# Patient Record
Sex: MALE | Race: AMERICAN INDIAN OR ALASKA NATIVE
[De-identification: names, ages, dates, MRNs, and addresses within clinical notes are randomized per-mention and may not be internally consistent; named-entity substitution may affect disease eponyms.]

---

## 2018-03-01 ENCOUNTER — HOSPITAL ENCOUNTER (INPATIENT)
Dept: HOSPITAL 5 - ED | Age: 78
LOS: 1 days | Discharge: HOME | DRG: 880 | End: 2018-03-02
Attending: INTERNAL MEDICINE | Admitting: INTERNAL MEDICINE
Payer: MEDICARE

## 2018-03-01 DIAGNOSIS — F41.9: Primary | ICD-10-CM

## 2018-03-01 DIAGNOSIS — Z82.49: ICD-10-CM

## 2018-03-01 DIAGNOSIS — I50.30: ICD-10-CM

## 2018-03-01 LAB
BASOPHILS # (AUTO): 0 K/MM3 (ref 0–0.1)
BASOPHILS # (AUTO): 0.1 K/MM3 (ref 0–0.1)
BASOPHILS NFR BLD AUTO: 0.4 % (ref 0–1.8)
BASOPHILS NFR BLD AUTO: 0.8 % (ref 0–1.8)
BUN SERPL-MCNC: 12 MG/DL (ref 9–20)
BUN SERPL-MCNC: 13 MG/DL (ref 9–20)
BUN/CREAT SERPL: 16 %
BUN/CREAT SERPL: 17 %
CALCIUM SERPL-MCNC: 8.3 MG/DL (ref 8.4–10.2)
CALCIUM SERPL-MCNC: 8.5 MG/DL (ref 8.4–10.2)
EOSINOPHIL # BLD AUTO: 0.1 K/MM3 (ref 0–0.4)
EOSINOPHIL # BLD AUTO: 0.1 K/MM3 (ref 0–0.4)
EOSINOPHIL NFR BLD AUTO: 0.9 % (ref 0–4.3)
EOSINOPHIL NFR BLD AUTO: 1.1 % (ref 0–4.3)
HCT VFR BLD CALC: 40 % (ref 35.5–45.6)
HCT VFR BLD CALC: 41.7 % (ref 35.5–45.6)
HDLC SERPL-MCNC: 45 MG/DL (ref 40–59)
HEMOLYSIS INDEX: 13
HEMOLYSIS INDEX: 69
HGB BLD-MCNC: 13.6 GM/DL (ref 11.8–15.2)
HGB BLD-MCNC: 13.9 GM/DL (ref 11.8–15.2)
LYMPHOCYTES # BLD AUTO: 1.8 K/MM3 (ref 1.2–5.4)
LYMPHOCYTES # BLD AUTO: 2.6 K/MM3 (ref 1.2–5.4)
LYMPHOCYTES NFR BLD AUTO: 23.9 % (ref 13.4–35)
LYMPHOCYTES NFR BLD AUTO: 30 % (ref 13.4–35)
MCH RBC QN AUTO: 31 PG (ref 28–32)
MCH RBC QN AUTO: 31 PG (ref 28–32)
MCHC RBC AUTO-ENTMCNC: 33 % (ref 32–34)
MCHC RBC AUTO-ENTMCNC: 34 % (ref 32–34)
MCV RBC AUTO: 91 FL (ref 84–94)
MCV RBC AUTO: 92 FL (ref 84–94)
MONOCYTES # (AUTO): 0.5 K/MM3 (ref 0–0.8)
MONOCYTES # (AUTO): 0.8 K/MM3 (ref 0–0.8)
MONOCYTES % (AUTO): 6.8 % (ref 0–7.3)
MONOCYTES % (AUTO): 9.3 % (ref 0–7.3)
PLATELET # BLD: 224 K/MM3 (ref 140–440)
PLATELET # BLD: 228 K/MM3 (ref 140–440)
RBC # BLD AUTO: 4.42 M/MM3 (ref 3.65–5.03)
RBC # BLD AUTO: 4.54 M/MM3 (ref 3.65–5.03)

## 2018-03-01 PROCEDURE — 85025 COMPLETE CBC W/AUTO DIFF WBC: CPT

## 2018-03-01 PROCEDURE — 78452 HT MUSCLE IMAGE SPECT MULT: CPT

## 2018-03-01 PROCEDURE — 80061 LIPID PANEL: CPT

## 2018-03-01 PROCEDURE — A9502 TC99M TETROFOSMIN: HCPCS

## 2018-03-01 PROCEDURE — 93010 ELECTROCARDIOGRAM REPORT: CPT

## 2018-03-01 PROCEDURE — 93005 ELECTROCARDIOGRAM TRACING: CPT

## 2018-03-01 PROCEDURE — 84484 ASSAY OF TROPONIN QUANT: CPT

## 2018-03-01 PROCEDURE — 93017 CV STRESS TEST TRACING ONLY: CPT

## 2018-03-01 PROCEDURE — 71046 X-RAY EXAM CHEST 2 VIEWS: CPT

## 2018-03-01 PROCEDURE — 93306 TTE W/DOPPLER COMPLETE: CPT

## 2018-03-01 PROCEDURE — 36415 COLL VENOUS BLD VENIPUNCTURE: CPT

## 2018-03-01 PROCEDURE — 80048 BASIC METABOLIC PNL TOTAL CA: CPT
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
## 2018-03-01 NOTE — XRAY REPORT
FINAL REPORT



EXAM:  XR CHEST ROUTINE 2V



HISTORY:  chest pain 



TECHNIQUE:  PA and lateral views of the chest



PRIORS:  None.



FINDINGS:  

Lines, tubes, and devices: N/A



Lungs and pleura: Trachea is normal in position.  Lungs are clear

of infiltrate, pleural effusion, vascular congestion, or

pneumothorax. 



Cardiomediastinal silhouette: Cardiac and mediastinal silhouettes

are unremarkable.



Other: Bony structures are intact.



IMPRESSION:  

No acute cardiopulmonary process seen.
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
## 2018-03-02 VITALS — SYSTOLIC BLOOD PRESSURE: 101 MMHG | DIASTOLIC BLOOD PRESSURE: 51 MMHG

## 2018-03-02 NOTE — CONSULTATION
History of Present Illness


Consult date: 03/02/18


Consult reason: chest pain


History of present illness: 





77 year old male very had of hearing presenting with ? chest pain. ECG showing 

SR with no ischemic changes. Cardiac enzymes are negative. MPI is normal. Echo 

showing normal LVEF.





Past History


Past Medical History: other (anxiety)


Past Surgical History: No surgical history, Other (reviewed)


Social history: single.  denies: smoking, alcohol abuse, prescription drug abuse


Family history: hypertension





Medications and Allergies


 Allergies











Allergy/AdvReac Type Severity Reaction Status Date / Time


 


No Known Allergies Allergy   Unverified 03/01/18 12:19











 Home Medications











 Medication  Instructions  Recorded  Confirmed  Last Taken  Type


 


No Known Home Medications [No  03/01/18 03/01/18 Unknown History





Reported Home Medications]     











Active Meds: 


Active Medications





Acetaminophen (Tylenol)  650 mg PO Q4H PRN


   PRN Reason: Pain MILD(1-3)/Fever >100.5/HA


Albuterol (Proventil)  2.5 mg IH Q4HRT PRN


   PRN Reason: Shortness Of Breath


Morphine Sulfate (Morphine)  2 mg IV Q4H PRN


   PRN Reason: Pain , Severe (7-10)


Nitroglycerin (Nitrostat)  0.4 mg SL Q5M PRN


   PRN Reason: Chest Pain


Ondansetron HCl (Zofran)  4 mg IV Q8H PRN


   PRN Reason: N/V unrelieved by Reglan


Sodium Chloride (Sodium Chloride Flush Syringe 10 Ml)  10 ml IV PRN PRN


   PRN Reason: LINE FLUSH











Review of Systems


ROS unobtainable: due to mental status





Physical Examination


 Vital Signs











Pulse Resp Pulse Ox


 


 59 L  18   98 


 


 03/01/18 12:24  03/01/18 12:24  03/01/18 12:24











General appearance: no acute distress


HEENT: Positive: PERRL


Neck: Positive: neck supple


Cardiac: Positive: Reg Rate and Rhythm


Lungs: Positive: Normal Exam


Abdomen: Positive: Soft


Extremities: Absent: edema





Results





 03/01/18 16:40





 03/01/18 16:40


 Lipids











  03/01/18 Range/Units





  16:40 


 


Triglycerides  71  (2-149)  mg/dL


 


Cholesterol  188  ()  mg/dL


 


HDL Cholesterol  45  (40-59)  mg/dL


 


Cholesterol/HDL Ratio  4.17  %








 CBC











  03/01/18 03/01/18 Range/Units





  12:42 16:40 


 


WBC  7.4  8.7  (4.5-11.0)  K/mm3


 


RBC  4.42  4.54  (3.65-5.03)  M/mm3


 


Hgb  13.6  13.9  (11.8-15.2)  gm/dl


 


Hct  40.0  41.7  (35.5-45.6)  %


 


Plt Count  228  224  (140-440)  K/mm3


 


Lymph #  1.8  2.6  (1.2-5.4)  K/mm3


 


Mono #  0.5  0.8  (0.0-0.8)  K/mm3


 


Eos #  0.1  0.1  (0.0-0.4)  K/mm3


 


Baso #  0.0  0.1  (0.0-0.1)  K/mm3








 Comprehensive Metabolic Panel











  03/01/18 03/01/18 Range/Units





  12:42 16:40 


 


Sodium  140  137  (137-145)  mmol/L


 


Potassium  4.1  4.4  (3.6-5.0)  mmol/L


 


Chloride  102.4  102.5  ()  mmol/L


 


Carbon Dioxide  23  21 L  (22-30)  mmol/L


 


BUN  13  12  (9-20)  mg/dL


 


Creatinine  0.8  0.7 L  (0.8-1.5)  mg/dL


 


Glucose  95  86  ()  mg/dL


 


Calcium  8.3 L  8.5  (8.4-10.2)  mg/dL














EKG interpretations





- Telemetry


EKG Rhythm: Sinus Rhythm





Assessment and Plan





? Chest Pain


   Normal MPI


   Normal LVEF


Hearing Impaired





No further cardiac intervention needed
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
## 2018-03-02 NOTE — TREADMILL REPORT
INDICATION:  Chest pain.



ORDERING PHYSICIAN:  Dr. Fang.



FINDINGS:  There is no scintigraphic evidence of myocardial ischemia.  The left

ventricle is normal in size and systolic function.  The left ventricular

ejection fraction is measured at 67% with normal wall motion and wall thickening

on gated imaging.



CONCLUSION:  Normal perfusion scan.





DD: 03/02/2018 12:11

DT: 03/02/2018 21:10

JOB# [id number]  [id number]

MISSY/NTS